# Patient Record
Sex: MALE | URBAN - METROPOLITAN AREA
[De-identification: names, ages, dates, MRNs, and addresses within clinical notes are randomized per-mention and may not be internally consistent; named-entity substitution may affect disease eponyms.]

---

## 2020-02-26 ENCOUNTER — HOSPITAL ENCOUNTER (OUTPATIENT)
Age: 35
Discharge: STILL ADMITTED OR ADMINISTRATIVE | End: 2020-02-26

## 2024-05-03 ENCOUNTER — HOSPITAL ENCOUNTER (OUTPATIENT)
Age: 39
Discharge: HOME | End: 2024-05-03
Payer: OTHER GOVERNMENT

## 2024-05-03 VITALS
RESPIRATION RATE: 20 BRPM | HEART RATE: 68 BPM | TEMPERATURE: 98 F | DIASTOLIC BLOOD PRESSURE: 95 MMHG | SYSTOLIC BLOOD PRESSURE: 138 MMHG | OXYGEN SATURATION: 100 %

## 2024-05-03 DIAGNOSIS — N34.2 URETHRITIS: ICD-10-CM

## 2024-05-03 DIAGNOSIS — R30.0 DYSURIA: Primary | ICD-10-CM

## 2024-05-03 PROCEDURE — 87661 TRICHOMONAS VAGINALIS AMPLIF: CPT | Performed by: NURSE PRACTITIONER

## 2024-05-03 PROCEDURE — 99203 OFFICE O/P NEW LOW 30 MIN: CPT | Performed by: NURSE PRACTITIONER

## 2024-05-03 PROCEDURE — 87591 N.GONORRHOEAE DNA AMP PROB: CPT | Performed by: NURSE PRACTITIONER

## 2024-05-03 PROCEDURE — 87491 CHLMYD TRACH DNA AMP PROBE: CPT | Performed by: NURSE PRACTITIONER

## 2024-05-03 NOTE — ED PROVIDER NOTES
Patient Seen in: [facility]      History     Chief Complaint   Patient presents with    Eval-G     Stated Complaint: sti testing    Subjective:   HPI  Patient is a 39-year-old male who presents to the immediate care center with concern for urethral irritation after he voided since yesterday.  He otherwise has no pain.  He has noticed some clearish drainage from the penis, describing that his underwear has been tightly wet at times.  He has had no abdominal or back pain; no pain or swelling within the scrotum.  His sexual partner describes that she had slight discomfort with intercourse several weeks ago.  Patient is here concern for sexually transmitted infection.            Objective:   History reviewed. No pertinent past medical history.           History reviewed. No pertinent surgical history.             Social History     Socioeconomic History    Marital status: Single   Tobacco Use    Smoking status: Never    Smokeless tobacco: Never              Review of Systems   Constitutional:  Negative for chills and fever.   Gastrointestinal:  Negative for abdominal pain.   Genitourinary:  Positive for dysuria. Negative for flank pain, frequency, hematuria and scrotal swelling.   Skin:  Negative for rash.   Neurological:  Negative for weakness.       Positive for stated complaint: sti testing  Other systems are as noted in HPI.  Constitutional and vital signs reviewed.      All other systems reviewed and negative except as noted above.    Physical Exam     ED Triage Vitals [05/03/24 0842]   BP (!) 138/95   Pulse 68   Resp 20   Temp 97.8 °F (36.6 °C)   Temp src Temporal   SpO2 100 %   O2 Device None (Room air)       Current:BP (!) 138/95   Pulse 68   Temp 97.8 °F (36.6 °C) (Temporal)   Resp 20   SpO2 100%         Physical Exam  Vitals and nursing note reviewed.   Constitutional:       General: He is not in acute distress.     Appearance: He is not ill-appearing.   Pulmonary:      Effort: Pulmonary  effort is normal. No respiratory distress.   Skin:     General: Skin is warm and dry.   Neurological:      Mental Status: He is alert and oriented to person, place, and time.   Psychiatric:         Behavior: Behavior normal.               ED Course   Labs Reviewed - No data to display                   MDM      STI screening test pending at time of disposition.                                   Medical Decision Making  Diff Dx considered today include, but are not exclusive of: Chlamydia, gonorrhea, trichomoniasis, chemical urethritis.    Problems Addressed:  Dysuria: self-limited or minor problem  Urethritis: self-limited or minor problem    Amount and/or Complexity of Data Reviewed  Labs: ordered.        Disposition and Plan     Clinical Impression:  1. Dysuria    2. Urethritis         Disposition:  Discharge  5/3/2024  9:00 am    Follow-up:  Your primary care provider  Call to schedule appointment to be seen in 5-7 days.    As needed          Medications Prescribed:  There are no discharge medications for this patient.

## 2024-05-03 NOTE — ED INITIAL ASSESSMENT (HPI)
Pt with burning, dribbling, and clear discharge all after urination, since yesterday; denies fever, hematuria, itching or pain while urinating; states girlfriend reported pain after intercourse 2 days ago

## 2024-05-06 LAB
C TRACH DNA SPEC QL NAA+PROBE: NEGATIVE
N GONORRHOEA DNA SPEC QL NAA+PROBE: NEGATIVE
TRICH VAG NAA: NEGATIVE